# Patient Record
Sex: MALE | Race: BLACK OR AFRICAN AMERICAN | NOT HISPANIC OR LATINO | Employment: STUDENT | URBAN - METROPOLITAN AREA
[De-identification: names, ages, dates, MRNs, and addresses within clinical notes are randomized per-mention and may not be internally consistent; named-entity substitution may affect disease eponyms.]

---
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
## 2025-02-28 NOTE — PROGRESS NOTES
[facility]  History & Physical  Orthopedics     SUBJECTIVE:    Dr. Franklin is a supervising physician for this patient/encounter     Chief Complaint: Left scaphoid fracture      Referring Provider: No ref. provider found      History of Present Illness:  Patient is a 18 y.o. right hand dominant male who presents today for evaluation of a left scaphoid fracture. Pt reports he fell from a skateboard on 7/18 and subsequently was found to have a scaphoid fracture. He does not endorse any pain today. 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 Carroll in 2016.      Interval history August 29, 2024: The patient returns today with his mom for re-evaluation.  He notes that he had a recent CT scan to evaluate for healing of his left scaphoid fracture and returns for those results and re-evaluation.  He has been in a brace since his last visit.    Interval history March 5, 2025: The patient returns today for re-evaluation.  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  He has remained immobilized in his left short-arm Titan brace since last visit, and he reports he has 1 occupational therapy visit left.  He denies utilizing medications for breakthrough pain.  He recently underwent a CT of the left wrist and returns today for those results and re-evaluation.      The patient is a/an unemployed.     Onset of symptoms/DOI was 7/18/24.     Symptoms are aggravated by  nothing .     Symptoms are alleviated by  nothing .     The patient rates their pain as a 0/10.     Attempted treatment(s) and/or interventions include activity modifications, rest, splinting/casting.     The patient denies any fevers, chills, N/V, D/C and presents for evaluation.             Past Medical History:   Diagnosis Date    Asthma      Closed left arm fracture              Past  Surgical History:   Procedure Laterality Date    ARTHROSCOPIC CHONDROPLASTY OF KNEE JOINT Right 11/18/2021     Procedure: ARTHROSCOPY, KNEE, WITH CHONDROPLASTY;  Surgeon: Edmund Martinez MD;  Location: [facility] OR [address] [place] [address];  Service: Orthopedics;  Laterality: Right;  medial femoral condyle    ARTHROSCOPY OF KNEE Right 11/18/2021     Procedure: ARTHROSCOPY, KNEE (Right) - debride ACL graft and bone graft tunnels;  Surgeon: Edmund Martinez MD;  [facility];  Service: Orthopedics;  Laterality: Right;  AC w [facility] notified 11/12/21 HD    CIRCUMCISION        KNEE ARTHROSCOPY W/ ACL RECONSTRUCTION Right 1/16/2020     Procedure: RECONSTRUCTION, KNEE, ACL, ARTHROSCOPIC (Right) - [employer] button/bioscrew fixation, hamstring graft.  Medial meniscus repair.;  Surgeon: Edmund Martinez MD;  Location: [facility] OR [address] [place] [address];  Service: Orthopedics;  Laterality: Right;    KNEE ARTHROSCOPY W/ ACL RECONSTRUCTION Right 4/29/2022     Procedure: RECONSTRUCTION, KNEE, ACL, ARTHROSCOPIC WITH PATELLAR TENDON AUTOGRAFT;  Surgeon: Edmund Martinez MD;  Location: [facility] OR;  Service: Orthopedics;  Laterality: Right;  BTB, LET ([facility])    KNEE ARTHROSCOPY W/ MENISCECTOMY Right 11/18/2021     Procedure: ARTHROSCOPY, KNEE, WITH MENISCECTOMY;  Surgeon: Edmund Martinez MD;  Location: [facility] OR [address] [place] [address];  Service: Orthopedics;  Laterality: Right;  medial    RECONSTRUCTION OF LIGAMENT Right 4/29/2022     Procedure: RECONSTRUCTION, LATERAL LIGAMENT;  Surgeon: Edmund Martinez MD;  Location: [facility] OR;  Service: Orthopedics;  Laterality: Right;            Review of patient's allergies indicates:   Allergen Reactions    Shellfish containing products Hives       All Seafood      Social History          Social History Narrative     Lives with parents (Anni and Salvador) and sister (Alice)           Attends [employer] high [employer] in the 12th grade.            Knee injury - acl torn/ligament injury             Family History   Problem Relation Name Age  "of Onset    Asthma Sister               Current Medications      Current Outpatient Medications:     albuterol (PROVENTIL) 2.5 mg /3 mL (0.083 %) nebulizer solution, ONE VIAL VIA NEBULIZER EVERY 4 HOURS AS NEEDED FOR WHEEZING OR FOR SHORTNESS OF BREATH, Disp: 90 mL, Rfl: 0    albuterol (PROVENTIL/VENTOLIN HFA) 90 mcg/actuation inhaler, TAKE 2 PUFFS BY MOUTH EVERY 6 HOURS AS NEEDED FOR WHEEZE (Patient not taking: Reported on 8/19/2024), Disp: 18 g, Rfl: 1    beclomethasone (QVAR) 40 mcg/actuation Aero, Inhale 2 puffs into the lungs once daily. (Patient not taking: Reported on 5/24/2023), Disp: 8.7 g, Rfl: 1    fluticasone-salmeterol diskus inhaler 500-50 mcg, Inhale 1 puff into the lungs 2 (two) times daily. Controller, Disp: 60 each, Rfl: 11    inhalation spacing device, Use as directed for inhalation. (Patient not taking: Reported on 5/24/2023), Disp: 1 Device, Rfl: 0  No current facility-administered medications for this visit.     Facility-Administered Medications Ordered in Other Visits:     fentaNYL 50 mcg/mL injection  mcg,  mcg, Intravenous, PRN, Kush Braga MD, 50 mcg at 04/29/22 0915    LIDOcaine (PF) 10 mg/ml (1%) injection 10 mg, 1 mL, Intradermal, Once, Kush Braga MD    midazolam (VERSED) 1 mg/mL injection 0.5-4 mg, 0.5-4 mg, Intravenous, PRN, Kush Braga MD, 2 mg at 04/29/22 0915    ropivacaine 0.2% Nimbus PainPRO Pump infusion 500 ML, , Perineural, Continuous, Kush Braga MD, New Bag at 04/29/22 1322           Review of Systems:  As per HPI otherwise noncontributory     OBJECTIVE:       Vital Signs (Most Recent):  Vitals       Vitals:     08/29/24 1130   Weight: 79.1 kg (174 lb 6.1 oz)   Height: 5' 9.53" (1.766 m)         Body mass index is 25.36 kg/m².        Physical Exam:  Constitutional: The patient appears well-developed and well-nourished. No distress.   Skin: No lesions appreciated  Head: Normocephalic and atraumatic.   Nose: Nose normal. "   Ears: No deformities seen  Eyes: Conjunctivae and EOM are normal.   Neck: No tracheal deviation present.   Cardiovascular: Normal rate and intact distal pulses.    Pulmonary/Chest: Effort normal. No respiratory distress.   Abdominal: There is no guarding.   Neurological: The patient is alert.   Psychiatric: The patient has a normal mood and affect.      Left Hand/Wrist Examination:     Observation/Inspection:  Swelling                       none                  Deformity                     none  Discoloration               none                  Scars                           dorsum of the left wrist reveal a incision that is well healed approximately 6 cm long               Atrophy                        None    He is nontender to palpate the scaphoid and the scapholunate interval or radiocarpal joint     HAND/WRIST EXAMINATION:  Finkelstein's Test                                Neg  WHAT Test                                         Neg  Snuff box tenderness                         negative left  Murray's Test                                     Neg  Hook of Hamate Tenderness              Neg  CMC grind                                           Neg  Circumduction test                              Neg     Neurovascular Exam:  Digits WWP, brisk CR < 3s throughout  NVI motor/LTS to M/R/U nerves, radial pulse 2+  Tinel's Test - Carpal Tunnel                Neg  Tinel's Test - Cubital Tunnel               Neg  Phalen's Test                                      Neg  Median Nerve Compression Test       Neg     ROM hand full, painless     ROM wrist full, painless    ROM elbow full, painless     Abdomen not guarded  Respirations nonlabored  Perfusion intact     Diagnostic Results:     Imaging - I independently viewed the patient's imaging as well as the radiology report.     CT left wrist - Impression:     Recent minimally displaced comminuted fracture of the mid scaphoid, without evidence for healing at this time; no  bridging callous.    FINDINGS:  Postsurgical change of ORIF of a left scaphoid nonunion with bone graft placement.  Interval development of bridging trabecula throughout most of the fracture site with mild residual lucency (approximately 20%) at the palmar aspect.  Slight bony prominence at the dorsal aspect of the proximal pole.  No hardware fracture or displacement.     Widened scapholunate interval.  No subluxation or dislocation.     Well-circumscribed lucency at the distal radius, likely corresponding with bone graft harvesting site.  No suspicious lytic or blastic lesions.  No periosteal reaction.     Visualized musculature demonstrates normal bulk and attenuation.     Limited evaluation of the flexor and extensor tendons appears within normal limits.     Subcutaneous soft tissues are normal.     Impression:     1. Postsurgical change of ORIF of a left scaphoid nonunion with bone graft placement.  Interval development of bridging trabecula throughout most of the fracture site with mild residual lucency at the palmar aspect.  2. Widened scapholunate interval concerning for scapholunate ligament injury.        ASSESSMENT/PLAN:       18 y.o. 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  We discussed the working diagnosis as well as several other potential alternative diagnoses.  Treatment options were discussed, both conservative and surgical.  Conservative treatment options would include things such as activity modifications, workplace modifications, a period of rest, oral vs topical OTC and prescription anti-inflammatory medications, occupational therapy, splinting/bracing, immobilization, corticosteroid injections, and others.  Surgical options were discussed as well.      At this time, the patient is now approximately 5 months and 3 weeks status post left  scaphoid nonunion with bone graft placement.  There is notable interval development of bridging throughout most of the fracture site with a mild lucency at the palmar aspect.  Given these CT findings, the patient may discontinue his left short-arm Titan wrist brace as tolerated.  He may resume activity and weight-bearing as tolerated to left upper extremity.  He further may resume work-related activities as tolerated.  New work note provided today.  I encouraged him to complete occupational therapy in its entirety.  He will follow up with our clinic as needed or sooner for any problems.    Should the patient's symptoms worsen I would be happy to see them back at anytime.

## 2025-03-03 ENCOUNTER — HOSPITAL ENCOUNTER (OUTPATIENT)
Dept: RADIOLOGY | Facility: HOSPITAL | Age: 20
Discharge: HOME | End: 2025-03-03
Payer: MEDICAID

## 2025-03-03 DIAGNOSIS — M25.532 LEFT WRIST PAIN: ICD-10-CM

## 2025-03-03 PROCEDURE — 73200 CT UPPER EXTREMITY W/O DYE: CPT | Mod: TC,LT

## 2025-03-03 PROCEDURE — 73200 CT UPPER EXTREMITY W/O DYE: CPT | Mod: 26,LT,, | Performed by: RADIOLOGY

## 2025-03-05 ENCOUNTER — OFFICE VISIT (OUTPATIENT)
Dept: ORTHOPEDICS | Facility: CLINIC | Age: 20
End: 2025-03-05
Payer: MEDICAID

## 2025-03-05 DIAGNOSIS — S62.002D CLOSED NONDISPLACED FRACTURE OF SCAPHOID OF LEFT WRIST WITH ROUTINE HEALING, UNSPECIFIED PORTION OF SCAPHOID, SUBSEQUENT ENCOUNTER: Primary | ICD-10-CM

## 2025-03-05 DIAGNOSIS — M25.532 LEFT WRIST PAIN: ICD-10-CM

## 2025-03-05 PROCEDURE — 99212 OFFICE O/P EST SF 10 MIN: CPT | Mod: PBBFAC

## 2025-03-05 PROCEDURE — 1159F MED LIST DOCD IN RCRD: CPT | Mod: CPTII,,,

## 2025-03-05 PROCEDURE — 99999 PR PBB SHADOW E&M-EST. PATIENT-LVL II: CPT | Mod: PBBFAC,,,

## 2025-03-05 PROCEDURE — 99213 OFFICE O/P EST LOW 20 MIN: CPT | Mod: S$PBB,,,

## 2025-03-05 PROCEDURE — 1160F RVW MEDS BY RX/DR IN RCRD: CPT | Mod: CPTII,,,

## 2025-05-01 ENCOUNTER — HOSPITAL ENCOUNTER (EMERGENCY)
Facility: HOSPITAL | Age: 20
Discharge: HOME | End: 2025-05-01
Attending: EMERGENCY MEDICINE
Payer: COMMERCIAL

## 2025-05-01 VITALS
DIASTOLIC BLOOD PRESSURE: 79 MMHG | HEART RATE: 76 BPM | TEMPERATURE: 98 F | WEIGHT: 160 LBS | RESPIRATION RATE: 16 BRPM | HEIGHT: 70 IN | SYSTOLIC BLOOD PRESSURE: 123 MMHG | OXYGEN SATURATION: 99 % | BODY MASS INDEX: 22.9 KG/M2
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
## 2025-05-01 PROCEDURE — 99283 EMERGENCY DEPT VISIT LOW MDM: CPT

## 2025-05-01 RX ORDER — IBUPROFEN 600 MG/1
600 TABLET, FILM COATED ORAL EVERY 6 HOURS PRN
Qty: 20 TABLET | Refills: 0 | Status: SHIPPED | OUTPATIENT
Start: 2025-05-01
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
## 2025-05-01 NOTE — DISCHARGE INSTRUCTIONS
Use ice and heat therapy, 20 minutes on and 20 minutes off.  May use ibuprofen and Tylenol as needed for pain and swelling.

## 2025-05-17 NOTE — TELEPHONE ENCOUNTER
----- Message from Mansi Wong sent at 2/18/2019  4:45 PM CST -----  Contact:  [phone]  Missed call     Communication Preference:Mom     Additional Information:  Mom is requesting to speak with the doctor about the pt having cold. Mom would like a script sent in to the pharmacy for the cold. Mom would like a call back as soon as possible.    Patient unable to complete

## (undated) DEVICE — CORD FOR BIPOLAR FORCEPS 12

## (undated) DEVICE — DRAPE INCISE IOBAN 2 23X17IN

## (undated) DEVICE — TUBE SET INFLOW/OUTFLOW

## (undated) DEVICE — SEQUENT DISPOSABLE KIT

## (undated) DEVICE — DRESSING GAUZE XEROFORM 5X9

## (undated) DEVICE — SPONGE LAP 18X18 PREWASHED

## (undated) DEVICE — SUT 2-0 VICRYL / SH (J417)

## (undated) DEVICE — TOURNIQUET HEMACLEAR MEDIUM

## (undated) DEVICE — BLADE SHAVER 4.5 6/BX

## (undated) DEVICE — Device

## (undated) DEVICE — SUT ETHILON 4-0 PS2 18 BLK

## (undated) DEVICE — TOWEL OR DISP STRL BLUE 4/PK

## (undated) DEVICE — TUBING SUC UNIV W/CONN 12FT

## (undated) DEVICE — PROBE ARTHSCP EDGE ENERGY 50

## (undated) DEVICE — PAD COLD THERAPY KNEE WRAP ON

## (undated) DEVICE — SUT MCRYL PLUS 4-0 PS2 27IN

## (undated) DEVICE — SUT PROLENE 3-0 PS-1 BL 18

## (undated) DEVICE — SEE MEDLINE ITEM 157131

## (undated) DEVICE — SUT LOOP HI-FI 20 WHT/BLU

## (undated) DEVICE — PADDING WYTEX UNDRCST 6INX4YD

## (undated) DEVICE — SOL IRR SOD CHL .9% POUR

## (undated) DEVICE — SEE MEDLINE ITEM 146292

## (undated) DEVICE — PACK ARTHROSCOPY

## (undated) DEVICE — DRAPE STERI INSTRUMENT 1018

## (undated) DEVICE — BRACE KNEE T SCOPE PREMIER

## (undated) DEVICE — CONSTANT DIAMETER REAMER

## (undated) DEVICE — LOOP PASSING HI-FI

## (undated) DEVICE — TRAY ARTHROSCOPY 2/CS

## (undated) DEVICE — DRAPE PLASTIC U 60X72

## (undated) DEVICE — SEE MEDLINE ITEM 146271

## (undated) DEVICE — SUT HSE FIBER 36IN MO6 NDL

## (undated) DEVICE — PAD CAST SPECIALIST STRL 6

## (undated) DEVICE — SUT VICRYL PLUS 0 CT1 18IN

## (undated) DEVICE — BNDG COFLEX FOAM LF2 ST 6X5YD

## (undated) DEVICE — DRAPE STERI-DRAPE 1000 17X11IN

## (undated) DEVICE — BLADE ELECTRO EDGE INSULATED

## (undated) DEVICE — PAD CAST 2 IN X 4YDS STERILE

## (undated) DEVICE — BLADE #15 STERILE CARBON

## (undated) DEVICE — SUT VICRYL 3-0 27 SH

## (undated) DEVICE — UNDERGLOVES BIOGEL PI SIZE 8

## (undated) DEVICE — DRAPE C-ARM MINI DISP

## (undated) DEVICE — NDL SPINAL 18GX3.5 SPINOCAN

## (undated) DEVICE — SUT VICRYL 4-0 RB1 27IN UD

## (undated) DEVICE — DRESSING XEROFORM NONADH 1X8IN

## (undated) DEVICE — SPONGE GAUZE 4X4 12 PLY STRL

## (undated) DEVICE — REAMER GRAFTMAX FLEX 10MM

## (undated) DEVICE — DRAPE EMERALD 87X114.75X113

## (undated) DEVICE — BLADE SHAVER LANZA 4.2X13CM

## (undated) DEVICE — CLOSURE SKIN STERI STRIP 1/4X3

## (undated) DEVICE — KIT ANATOMIC ACL DISPOSABLE

## (undated) DEVICE — PADDING CAST 4IN SPECIALIST

## (undated) DEVICE — PAD CAST SPECIALIST STRL 4

## (undated) DEVICE — GUIDE GRAFTMAX XACTPIN FLEX
Type: IMPLANTABLE DEVICE | Site: KNEE | Status: NON-FUNCTIONAL
Removed: 2022-04-29

## (undated) DEVICE — DRAPE STERI U-SHAPED 47X51IN

## (undated) DEVICE — SUT 0 VICRYL / CT-1

## (undated) DEVICE — COVER CAMERA OPERATING ROOM

## (undated) DEVICE — PENCIL ROCKER SWITCH 10FT CORD

## (undated) DEVICE — GAUZE SPONGE 4X4 12PLY

## (undated) DEVICE — ELECTRODE REM PLYHSV RETURN 9

## (undated) DEVICE — APPLICATOR CHLORAPREP ORN 26ML

## (undated) DEVICE — BANDAGE MATRIX HK LOOP 6IN 5YD

## (undated) DEVICE — BANDAGE MATRIX HK LOOP 2IN 5YD

## (undated) DEVICE — DRESSING XEROFORM FOIL PK 1X8

## (undated) DEVICE — GLOVE BIOGEL PI MICRO SZ 7.5

## (undated) DEVICE — SEE MEDLINE ITEM 157216

## (undated) DEVICE — PAD PREP CUFFED NS 24X48IN

## (undated) DEVICE — SUT MONOCRYL 4-0 PS-2

## (undated) DEVICE — SLING ARM LARGE FOAM STRAP

## (undated) DEVICE — SEE MEDLINE ITEM 146313

## (undated) DEVICE — BLADE SURG #15 CARBON STEEL

## (undated) DEVICE — SUT VICRYL+ 1 CT1 18IN

## (undated) DEVICE — CLOSURE SKIN STERI STRIP 1/2X4

## (undated) DEVICE — PIN GUIDE GRAFT PASS XACTPIN
Type: IMPLANTABLE DEVICE | Site: KNEE | Status: NON-FUNCTIONAL
Removed: 2020-01-16

## (undated) DEVICE — SPLINT PLASTER F.S 4INX15IN

## (undated) DEVICE — ELECTRODE BLADE INSULATED 1 IN

## (undated) DEVICE — ADHESIVE DERMABOND ADVANCED

## (undated) DEVICE — GOWN ECLIPSE REINF LVL4 TWL XL

## (undated) DEVICE — BLADE SURG CARBON STEEL SZ11

## (undated) DEVICE — PUMP COLD THERAPY

## (undated) DEVICE — YANKAUER OPEN TIP W/O VENT

## (undated) DEVICE — IMPLANTABLE DEVICE
Type: IMPLANTABLE DEVICE | Site: HAND | Status: NON-FUNCTIONAL
Removed: 2024-09-06

## (undated) DEVICE — BLADE SHAVER GREAT WHITE 5.5MM

## (undated) DEVICE — SUT VICRYL PLUS 3-0 SH 18IN

## (undated) DEVICE — SOL IRR NACL .9% 3000ML

## (undated) DEVICE — GAUZE SPONGE 4X4 12 PLY NS

## (undated) DEVICE — REAMER GRAFTMAX FLEX 5MM

## (undated) DEVICE — BANDAGE ACE ELASTIC 6"

## (undated) DEVICE — PAD ABD 8X10 STERILE

## (undated) DEVICE — BANDAGE MATRIX HK LOOP 4IN 5YD

## (undated) DEVICE — DRESSING XEROFORM 1X8IN

## (undated) DEVICE — REAMER 8.5MM GRAFTMAX FLEX

## (undated) DEVICE — DRAPE STERI INCISE 17X23IN